# Patient Record
Sex: FEMALE | Race: WHITE | NOT HISPANIC OR LATINO | Employment: UNEMPLOYED | URBAN - METROPOLITAN AREA
[De-identification: names, ages, dates, MRNs, and addresses within clinical notes are randomized per-mention and may not be internally consistent; named-entity substitution may affect disease eponyms.]

---
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
## 2024-04-08 ENCOUNTER — TRANSCRIBE ORDERS (OUTPATIENT)
Dept: CARDIOLOGY | Facility: HOSPITAL | Age: 19
End: 2024-04-08
Payer: COMMERCIAL

## 2024-04-08 ENCOUNTER — HOSPITAL ENCOUNTER (OUTPATIENT)
Dept: CARDIOLOGY | Facility: HOSPITAL | Age: 19
Discharge: HOME | End: 2024-04-08
Admitting: STUDENT IN AN ORGANIZED HEALTH CARE EDUCATION/TRAINING PROGRAM
Payer: COMMERCIAL

## 2024-04-08 DIAGNOSIS — R06.02 SHORTNESS OF BREATH: Primary | ICD-10-CM

## 2024-04-08 PROCEDURE — 93005 ELECTROCARDIOGRAM TRACING: CPT

## 2024-04-09 LAB
QT INTERVAL: 356 MS
QTC INTERVAL: 462 MS

## 2024-05-16 ENCOUNTER — OFFICE VISIT (OUTPATIENT)
Dept: CARDIOLOGY | Facility: CLINIC | Age: 19
End: 2024-05-16
Payer: COMMERCIAL

## 2024-05-16 VITALS
WEIGHT: 107 LBS | BODY MASS INDEX: 18.96 KG/M2 | DIASTOLIC BLOOD PRESSURE: 72 MMHG | HEART RATE: 83 BPM | HEIGHT: 63 IN | SYSTOLIC BLOOD PRESSURE: 110 MMHG

## 2024-05-16 DIAGNOSIS — R94.31 NONSPECIFIC ABNORMAL ELECTROCARDIOGRAM (ECG): ICD-10-CM

## 2024-05-16 DIAGNOSIS — R06.02 SHORTNESS OF BREATH: Primary | ICD-10-CM

## 2024-05-16 DIAGNOSIS — G90.A POTS (POSTURAL ORTHOSTATIC TACHYCARDIA SYNDROME): ICD-10-CM

## 2024-05-16 DIAGNOSIS — R42 DIZZINESS: ICD-10-CM

## 2024-05-16 PROCEDURE — 93000 ELECTROCARDIOGRAM COMPLETE: CPT | Performed by: INTERNAL MEDICINE

## 2024-05-16 PROCEDURE — 99204 OFFICE O/P NEW MOD 45 MIN: CPT | Performed by: INTERNAL MEDICINE

## 2024-05-16 NOTE — PROGRESS NOTES
Date of Office Visit: 2024  Encounter Provider: Kindra Blackburn MD  Place of Service: [facility] CARDIOLOGY  Patient Name: Dayana Sosa  :2005      Patient ID:  Dayana Sosa is a 19 y.o. female is here for dyspnea and dizziness.           History of Present Illness    She has history of asthma, depression anxiety, dysmenorrhea, acne, ADHD.  She is here for short windedness, dizziness and abnormal ECG.    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  Her grandparents have had heart disease, strokes, hypertension and diabetes.  She lives with her grandparents, her dad and 3 cats    She had a normal TSH and free T4 done 2023.  She saw her PCP for dizziness and was told she had a vasovagal reaction.    She has had short windedness with dizziness, syncope and near syncope since early high school.  The syncope has actually gotten better and she has had no recent syncope but when she is head upright, she will be in sweating, she has nausea, she feels her heart pounding, her vision changes in the periphery and then she feels like she is going to pass out.  She now notices this when she is trying to do any physical activity, she is very short winded and feels her heart racing, she gets severely weak and fatigued and has to lie down.    She does not exercise regularly.  She has never been diagnosed with a heart rhythm disturbance.  She is on spironolactone for acne.  It really does help her.    History reviewed. No pertinent past medical history.      History reviewed. No pertinent surgical history.    Current Outpatient Medications on File Prior to Visit   Medication Sig Dispense Refill    albuterol sulfate  (90 Base) MCG/ACT inhaler Inhale 2-4 puffs Every 4 (Four) to 6 (Six) Hours As Needed. 6.7 g 1    desogestrel-ethinyl estradiol (Isibloom) 0.15-30 MG-MCG per  tablet Take 1 tablet by mouth Daily. 28 tablet 6    FLUoxetine (PROzac) 40 MG capsule Take 1 capsule by mouth Daily. 30 capsule 0    hydrocortisone 2.5 % cream Apply 1 application  topically to the appropriate area as directed Daily As Needed. Do not use more than 15 days 30 g 2    lisdexamfetamine (Vyvanse) 40 MG capsule Take 1 capsule by mouth Daily. 30 capsule 0    spironolactone (ALDACTONE) 100 MG tablet Take 1 tablet by mouth Daily. 30 tablet 7    [DISCONTINUED] atomoxetine (STRATTERA) 80 MG capsule Take 1 capsule by mouth Daily after completing 1 week of 40 mg atomoxetine (Patient not taking: Reported on 5/16/2024) 7 capsule 0    [DISCONTINUED] cephalexin (KEFLEX) 500 MG capsule Take 1 capsule by mouth twice daily (Patient not taking: Reported on 5/16/2024) 14 capsule 0    [DISCONTINUED] dexmethylphenidate XR (Focalin XR) 10 MG 24 hr capsule Take 1 capsule by mouth Daily (Patient not taking: Reported on 5/16/2024) 30 capsule 0    [DISCONTINUED] dexmethylphenidate XR (Focalin XR) 20 MG 24 hr capsule Take 1 capsule by mouth Daily (Patient not taking: Reported on 5/16/2024) 30 capsule 0    [DISCONTINUED] dexmethylphenidate XR (Focalin XR) 30 MG 24 hr capsule Take 1 capsule by mouth Daily. (Patient not taking: Reported on 5/16/2024) 30 capsule 0    [DISCONTINUED] FLUoxetine (PROzac) 10 MG capsule Take 1 capsule by mouth Daily with 20 mg (Patient not taking: Reported on 5/16/2024) 30 capsule 0    [DISCONTINUED] FLUoxetine (PROzac) 10 MG capsule Take 1 capsule by mouth Daily for 5 days to finish med taper, take 3-5 day med break before starting new medication (Patient not taking: Reported on 5/16/2024) 5 capsule 0    [DISCONTINUED] FLUoxetine (PROzac) 20 MG capsule Take 1 capsule by mouth Daily with 10mg for total dose of 30mg. 30 capsule 0    [DISCONTINUED] FLUoxetine (PROzac) 20 MG capsule Take one capsule by mouth daily 30 capsule 2    [DISCONTINUED] FLUoxetine (PROzac) 40 MG capsule Take 1 capsule by mouth daily  (Patient not taking: Reported on 5/16/2024) 90 capsule 0    [DISCONTINUED] hydrOXYzine pamoate (VISTARIL) 25 MG capsule Take 1 capsule by mouth At Night As Needed. (Patient not taking: Reported on 5/16/2024) 30 capsule 6    [DISCONTINUED] lisdexamfetamine (Vyvanse) 20 MG capsule Take 1 capsule by mouth Daily (Patient not taking: Reported on 5/16/2024) 30 capsule 0    [DISCONTINUED] lisdexamfetamine (Vyvanse) 30 MG capsule Take 1 capsule by mouth Daily. (Patient not taking: Reported on 5/16/2024) 30 capsule 0    [DISCONTINUED] minocycline (MINOCIN,DYNACIN) 100 MG capsule Take 1 capsule by mouth Daily with water (Patient not taking: Reported on 5/16/2024) 30 capsule 3    [DISCONTINUED] minocycline (MINOCIN,DYNACIN) 100 MG capsule take 1 capsule by mouth daily with water (Patient not taking: Reported on 5/16/2024) 30 capsule 0    [DISCONTINUED] ofloxacin (OCUFLOX) 0.3 % ophthalmic solution Apply 2 drops to eye(s) as directed by provider 2 (Two) Times a Day. (Patient not taking: Reported on 5/16/2024) 10 mL 0    [DISCONTINUED] spironolactone (ALDACTONE) 100 MG tablet Take 1 tablet by mouth Daily. 30 tablet 3    [DISCONTINUED] spironolactone (ALDACTONE) 100 MG tablet take 1 tablet by mouth daily (Patient not taking: Reported on 5/16/2024) 30 tablet 6    [DISCONTINUED] spironolactone (ALDACTONE) 50 MG tablet Take 1 tablet by mouth every night at bedtime. (Patient not taking: Reported on 5/16/2024) 30 tablet 3    [DISCONTINUED] spironolactone (ALDACTONE) 50 MG tablet Take 2 tablets by mouth Every Night. (Patient not taking: Reported on 5/16/2024) 60 tablet 2     No current facility-administered medications on file prior to visit.       Social History     Socioeconomic History    Marital status: Single    Number of children: 0   Tobacco Use    Smoking status: Never     Passive exposure: Never    Smokeless tobacco: Never    Tobacco comments:     Caffeine: 1 tea per day   Vaping Use    Vaping status: Never Used   Substance  "and Sexual Activity    Alcohol use: Not Currently    Drug use: Not Currently    Sexual activity: Defer             Procedures    ECG 12 Lead    Date/Time: 5/16/2024 9:15 AM  Performed by: Kindra Blackburn MD    Authorized by: Kindra Blackburn MD  Comparison: compared with previous ECG   Similar to previous ECG  Rhythm: sinus rhythm  QRS axis: right    Clinical impression: non-specific ECG              Objective:      Vitals:    05/16/24 0853   BP: 110/72   Pulse: 83   Weight: 48.5 kg (107 lb)   Height: 160 cm (63\")     Body mass index is 18.95 kg/m².    Vitals reviewed.   Constitutional:       General: Not in acute distress.     Appearance: Not diaphoretic.   Neck:      Vascular: No carotid bruit or JVD.   Pulmonary:      Effort: Pulmonary effort is normal.      Breath sounds: Normal breath sounds.   Cardiovascular:      Normal rate. Regular rhythm.      Murmurs: There is no murmur.      No gallop.  No rub.   Pulses:     Intact distal pulses.      Carotid: 2+ bilaterally.     Radial: 2+ bilaterally.     Dorsalis pedis: 2+ bilaterally.     Posterior tibial: 2+ bilaterally.  Edema:     Peripheral edema absent.   Neurological:      Cranial Nerves: No cranial nerve deficit.         Lab Review:       Assessment:      Diagnosis Plan   1. Shortness of breath  Holter Monitor - 72 Hour Up To 15 Days    Adult Transthoracic Echo Complete W/ Cont if Necessary Per Protocol      2. Dizziness  Holter Monitor - 72 Hour Up To 15 Days    Adult Transthoracic Echo Complete W/ Cont if Necessary Per Protocol      3. Nonspecific abnormal electrocardiogram (ECG)  Holter Monitor - 72 Hour Up To 15 Days    Adult Transthoracic Echo Complete W/ Cont if Necessary Per Protocol        Short windedness with activity associated with heart racing, severe fatigue and weakness.  I think she has deconditioning at this point but has signs and symptoms consistent with postural orthostatic tachycardia syndrome.  I provided the [facility] article about " POTS as well as a lifestyle instructional sheet for treatment of POTS.  I also advised her to increase her protein consumption in her diet as well as salt water.  I will get an echocardiogram for slightly abnormal ECG as well as set the monitor to make sure she does not have SVT.  Acne, spironolactone has really helped with this and she does not really want to stop it.  ADHD, on Vyvanse     Plan:       No medication changes at this time.  Orthostatics today show blood pressure 110/70 there is 74 after lying flat for 5 minutes, blood pressure 100/70 with heart rate 82 after standing for 1 minute-she has black spots in her eyes, blood pressure 110/76 with a heart rate of 90 after standing for 3 minutes.    She is weakly positive for POTS.  Would have her institute lifestyle measures as noted above.  See nurse practitioner in 4 weeks and see me in 8 to 9 weeks.

## 2024-06-04 ENCOUNTER — OFFICE VISIT (OUTPATIENT)
Dept: OBSTETRICS AND GYNECOLOGY | Facility: CLINIC | Age: 19
End: 2024-06-04
Payer: COMMERCIAL

## 2024-06-04 VITALS
HEIGHT: 63 IN | DIASTOLIC BLOOD PRESSURE: 72 MMHG | WEIGHT: 102 LBS | BODY MASS INDEX: 18.07 KG/M2 | SYSTOLIC BLOOD PRESSURE: 104 MMHG

## 2024-06-04 DIAGNOSIS — N89.8 VAGINAL IRRITATION: Primary | ICD-10-CM

## 2024-06-04 RX ORDER — FLUCONAZOLE 150 MG/1
150 TABLET ORAL
Qty: 2 TABLET | Refills: 0 | Status: SHIPPED | OUTPATIENT
Start: 2024-06-04

## 2024-06-04 NOTE — PROGRESS NOTES
"GYN EXAM    Chief Complaint   Patient presents with    ngyn     Pt here today for vaginal irritation using feminine pads and is dry and cracked        SUBJECTIVE     Dayana is a 19 y.o. No obstetric history on file. who presents with complaints of itching and cracking of vulvar skin for the past month. She reports that this started with her last menses with pad use. She tried aquaphor and it soothed her skin temporarily. She also reports getting partial relief using monistat treatment.     Discharge: white discharge  Itching: endorses  Vaginal odor: denies  Pelvic pain: denies  Dysuria: denies  Urine odor: denies  New sexual partners: denies  Change in soaps or detergents: denies  Douching: denies    History reviewed. No pertinent past medical history.     History reviewed. No pertinent surgical history.     Review of Systems   Genitourinary:  Negative for decreased urine volume, difficulty urinating, dyspareunia, dysuria, flank pain, frequency, genital sores, hematuria, urgency, vaginal bleeding, vaginal discharge and vaginal pain.   All other systems reviewed and are negative.      OBJECTIVE     Vitals:    06/04/24 1359   BP: 104/72   Weight: 46.3 kg (102 lb)   Height: 160 cm (62.99\")        Physical Exam  Constitutional:       General: She is awake.      Appearance: Normal appearance. She is well-developed and well-groomed.   Genitourinary:      Right Labia: rash.      Left Labia: rash.           HENT:      Head: Normocephalic and atraumatic.   Pulmonary:      Effort: Pulmonary effort is normal.   Musculoskeletal:      Cervical back: Normal range of motion.   Neurological:      General: No focal deficit present.      Mental Status: She is alert and oriented to person, place, and time.   Skin:     General: Skin is warm and dry.   Psychiatric:         Mood and Affect: Mood normal.         Behavior: Behavior normal. Behavior is cooperative.   Vitals reviewed.         ASSESSMENT/PLAN    Diagnoses and all orders for " this visit:    1. Vaginal irritation (Primary)  -     NuSwab VG+ - Swab, Vagina  -     fluconazole (DIFLUCAN) 150 MG tablet; Take 1 tablet by mouth Every 3 (Three) Days.  Dispense: 2 tablet; Refill: 0    STD screen today - NuSwab.  Vaginal cultures obtained.   Encouraged condoms with intercourse, cotton only underwear, mild or no soaps, avoidance of douching or pierre steaming.   Encouraged adding probiotic daily or vaginal boric acid suppositories.     Return if symptoms worsen or fail to improve.    I spent 15 minutes caring for Dayana on this date of service. This time includes time spent by me in the following activities: preparing for the visit, reviewing tests, obtaining and/or reviewing a separately obtained history, performing a medically appropriate examination and/or evaluation, counseling and educating the patient/family/caregiver, ordering medications, tests, or procedures, referring and communicating with other health care professionals, documenting information in the medical record, independently interpreting results and communicating that information with the patient/family/caregiver, and care coordination    Shanda Garcia CNM  6/4/2024  15:11 EDT

## 2024-06-05 LAB
A VAGINAE DNA VAG QL NAA+PROBE: NORMAL SCORE
BVAB2 DNA VAG QL NAA+PROBE: NORMAL SCORE
C ALBICANS DNA VAG QL NAA+PROBE: NEGATIVE
C GLABRATA DNA VAG QL NAA+PROBE: NEGATIVE
C TRACH DNA VAG QL NAA+PROBE: NEGATIVE
MEGA1 DNA VAG QL NAA+PROBE: NORMAL SCORE
N GONORRHOEA DNA VAG QL NAA+PROBE: NEGATIVE
T VAGINALIS DNA VAG QL NAA+PROBE: NEGATIVE

## 2024-06-13 ENCOUNTER — HOSPITAL ENCOUNTER (OUTPATIENT)
Dept: CARDIOLOGY | Facility: HOSPITAL | Age: 19
Discharge: HOME | End: 2024-06-13
Admitting: INTERNAL MEDICINE
Payer: COMMERCIAL

## 2024-06-13 ENCOUNTER — PATIENT MESSAGE (OUTPATIENT)
Dept: OBSTETRICS AND GYNECOLOGY | Facility: CLINIC | Age: 19
End: 2024-06-13
Payer: COMMERCIAL

## 2024-06-13 ENCOUNTER — PATIENT ROUNDING (BHMG ONLY) (OUTPATIENT)
Dept: OBSTETRICS AND GYNECOLOGY | Facility: CLINIC | Age: 19
End: 2024-06-13
Payer: COMMERCIAL

## 2024-06-13 VITALS
WEIGHT: 102 LBS | DIASTOLIC BLOOD PRESSURE: 78 MMHG | SYSTOLIC BLOOD PRESSURE: 110 MMHG | HEIGHT: 62 IN | BODY MASS INDEX: 18.77 KG/M2 | HEART RATE: 96 BPM

## 2024-06-13 DIAGNOSIS — R42 DIZZINESS: ICD-10-CM

## 2024-06-13 DIAGNOSIS — R06.02 SHORTNESS OF BREATH: ICD-10-CM

## 2024-06-13 DIAGNOSIS — R94.31 NONSPECIFIC ABNORMAL ELECTROCARDIOGRAM (ECG): ICD-10-CM

## 2024-06-13 LAB
AORTIC ARCH: 2.1 CM
AORTIC DIMENSIONLESS INDEX: 0.8 (DI)
BH CV ECHO MEAS - ACS: 1.71 CM
BH CV ECHO MEAS - AO MAX PG: 5.3 MMHG
BH CV ECHO MEAS - AO MEAN PG: 3.1 MMHG
BH CV ECHO MEAS - AO ROOT DIAM: 2.46 CM
BH CV ECHO MEAS - AO V2 MAX: 115.6 CM/SEC
BH CV ECHO MEAS - AO V2 VTI: 21.7 CM
BH CV ECHO MEAS - AVA(I,D): 1.82 CM2
BH CV ECHO MEAS - EDV(CUBED): 64.3 ML
BH CV ECHO MEAS - EDV(MOD-SP2): 62 ML
BH CV ECHO MEAS - EDV(MOD-SP4): 65 ML
BH CV ECHO MEAS - EF(MOD-BP): 62 %
BH CV ECHO MEAS - EF(MOD-SP2): 61.3 %
BH CV ECHO MEAS - EF(MOD-SP4): 61.5 %
BH CV ECHO MEAS - ESV(CUBED): 20 ML
BH CV ECHO MEAS - ESV(MOD-SP2): 24 ML
BH CV ECHO MEAS - ESV(MOD-SP4): 25 ML
BH CV ECHO MEAS - FS: 32.2 %
BH CV ECHO MEAS - IVS/LVPW: 0.96 CM
BH CV ECHO MEAS - IVSD: 0.65 CM
BH CV ECHO MEAS - LV DIASTOLIC VOL/BSA (35-75): 45.3 CM2
BH CV ECHO MEAS - LV MASS(C)D: 73.4 GRAMS
BH CV ECHO MEAS - LV MAX PG: 3.4 MMHG
BH CV ECHO MEAS - LV MEAN PG: 1.98 MMHG
BH CV ECHO MEAS - LV SYSTOLIC VOL/BSA (12-30): 17.4 CM2
BH CV ECHO MEAS - LV V1 MAX: 91.7 CM/SEC
BH CV ECHO MEAS - LV V1 VTI: 16.4 CM
BH CV ECHO MEAS - LVIDD: 4 CM
BH CV ECHO MEAS - LVIDS: 2.7 CM
BH CV ECHO MEAS - LVOT AREA: 2.41 CM2
BH CV ECHO MEAS - LVOT DIAM: 1.75 CM
BH CV ECHO MEAS - LVPWD: 0.68 CM
BH CV ECHO MEAS - MV A DUR: 0.08 SEC
BH CV ECHO MEAS - MV A MAX VEL: 75.6 CM/SEC
BH CV ECHO MEAS - MV DEC SLOPE: 556.5 CM/SEC2
BH CV ECHO MEAS - MV DEC TIME: 0.17 SEC
BH CV ECHO MEAS - MV E MAX VEL: 80.1 CM/SEC
BH CV ECHO MEAS - MV E/A: 1.06
BH CV ECHO MEAS - MV MAX PG: 3.8 MMHG
BH CV ECHO MEAS - MV MEAN PG: 1.96 MMHG
BH CV ECHO MEAS - MV P1/2T: 51 MSEC
BH CV ECHO MEAS - MV V2 VTI: 20.2 CM
BH CV ECHO MEAS - MVA(P1/2T): 4.3 CM2
BH CV ECHO MEAS - MVA(VTI): 1.97 CM2
BH CV ECHO MEAS - PA ACC TIME: 0.12 SEC
BH CV ECHO MEAS - PA V2 MAX: 112.9 CM/SEC
BH CV ECHO MEAS - PULM A REVS DUR: 0.08 SEC
BH CV ECHO MEAS - PULM A REVS VEL: 42.8 CM/SEC
BH CV ECHO MEAS - PULM DIAS VEL: 57.3 CM/SEC
BH CV ECHO MEAS - PULM S/D: 0.69
BH CV ECHO MEAS - PULM SYS VEL: 39.6 CM/SEC
BH CV ECHO MEAS - RAP SYSTOLE: 3 MMHG
BH CV ECHO MEAS - RV MAX PG: 3.2 MMHG
BH CV ECHO MEAS - RV V1 MAX: 89.2 CM/SEC
BH CV ECHO MEAS - RV V1 VTI: 16.8 CM
BH CV ECHO MEAS - RVSP: 20 MMHG
BH CV ECHO MEAS - SUP REN AO DIAM: 1.8 CM
BH CV ECHO MEAS - SV(LVOT): 39.6 ML
BH CV ECHO MEAS - SV(MOD-SP2): 38 ML
BH CV ECHO MEAS - SV(MOD-SP4): 40 ML
BH CV ECHO MEAS - SVI(LVOT): 27.6 ML/M2
BH CV ECHO MEAS - SVI(MOD-SP2): 26.5 ML/M2
BH CV ECHO MEAS - SVI(MOD-SP4): 27.9 ML/M2
BH CV ECHO MEAS - TAPSE (>1.6): 1.6 CM
BH CV ECHO MEAS - TR MAX PG: 17 MMHG
BH CV ECHO MEAS - TR MAX VEL: 206.3 CM/SEC
LEFT ATRIUM VOLUME INDEX: 13.8 ML/M2
SINUS: 2.14 CM
STJ: 1.84 CM

## 2024-06-13 PROCEDURE — 93306 TTE W/DOPPLER COMPLETE: CPT

## 2024-06-13 NOTE — PROGRESS NOTES
My chart message has been sent to the patient for PATIENT ROUNDING with [facility] [place].

## 2024-06-14 ENCOUNTER — TELEPHONE (OUTPATIENT)
Dept: CARDIOLOGY | Facility: CLINIC | Age: 19
End: 2024-06-14
Payer: COMMERCIAL

## 2024-06-14 NOTE — TELEPHONE ENCOUNTER
Please let her know that echo looks good.  Her heart is strong and functioning well.  Would continue current medications and keep currently scheduled follow-up appointment with Dr. Blackburn in July.

## 2024-06-14 NOTE — TELEPHONE ENCOUNTER
Left voicemail for Dayana Lisa Manisha Sosa requesting callback.    HUB: please transfer to Triage if patient returns call    Thank you,  Gladis BARRIENTOS RN  Triage Nurse [facility]   12:19 EDT

## 2024-06-17 NOTE — TELEPHONE ENCOUNTER
Left voicemail for Dayanayakelin Sosa requesting callback.    HUB: please transfer to Triage if patient returns call    Thank you,  Gladis BARRIENTOS RN  Triage Nurse [facility] [place]   14:30 EDT

## 2024-07-10 ENCOUNTER — TELEPHONE (OUTPATIENT)
Dept: CARDIOLOGY | Facility: CLINIC | Age: 19
End: 2024-07-10
Payer: COMMERCIAL

## 2024-07-10 NOTE — TELEPHONE ENCOUNTER
Please call patient, monitor shows sinus tachycardia when she has dizziness or short windedness.  Overall benign monitor.  Monitor seems also to correlate well with POTS.

## 2024-07-10 NOTE — TELEPHONE ENCOUNTER
Called and left VM, will continue to try to reach pt.    HUB- please put patient straight through to triage    Deepali Rivera, RN  Triage RN  07/10/24 08:38 EDT

## 2024-07-11 NOTE — TELEPHONE ENCOUNTER
Called and left VM, will continue to try to reach pt.    HUB- please put patient straight through to triage    Deepali Rivera RN  Triage RN  07/11/24 09:11 EDT

## 2024-07-12 NOTE — TELEPHONE ENCOUNTER
Called and left VM, will continue to try to reach pt.    HUB- please put patient straight through to triage    Deepali Rivera RN  Triage RN  07/12/24 09:19 EDT

## 2024-07-18 ENCOUNTER — OFFICE VISIT (OUTPATIENT)
Dept: CARDIOLOGY | Facility: CLINIC | Age: 19
End: 2024-07-18
Payer: COMMERCIAL

## 2024-07-18 VITALS
DIASTOLIC BLOOD PRESSURE: 70 MMHG | WEIGHT: 106 LBS | BODY MASS INDEX: 19.51 KG/M2 | SYSTOLIC BLOOD PRESSURE: 110 MMHG | OXYGEN SATURATION: 99 % | HEIGHT: 62 IN | HEART RATE: 87 BPM

## 2024-07-18 DIAGNOSIS — G90.A POTS (POSTURAL ORTHOSTATIC TACHYCARDIA SYNDROME): Primary | ICD-10-CM

## 2024-07-18 PROCEDURE — 93000 ELECTROCARDIOGRAM COMPLETE: CPT | Performed by: INTERNAL MEDICINE

## 2024-07-18 PROCEDURE — 99214 OFFICE O/P EST MOD 30 MIN: CPT | Performed by: INTERNAL MEDICINE

## 2024-07-18 RX ORDER — MIDODRINE HYDROCHLORIDE 2.5 MG/1
2.5 TABLET ORAL 2 TIMES DAILY
Qty: 60 TABLET | Refills: 11 | Status: SHIPPED | OUTPATIENT
Start: 2024-07-18

## 2024-07-18 NOTE — PROGRESS NOTES
Date of Office Visit: 2024  Encounter Provider: Kindra Blackburn MD  Place of Service: [facility] CARDIOLOGY  Patient Name: Dayana Sosa  :2005      Patient ID:  Dayana Sosa is a 19 y.o. female is here for dyspnea and dizziness.           History of Present Illness    She has history of asthma, depression anxiety, dysmenorrhea, acne, ADHD.  She is here for short windedness, dizziness and abnormal ECG.    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  Her grandparents have had heart disease, strokes, hypertension and diabetes.  She lives with her grandparents, her dad and 3 cats    She had a normal TSH and free T4 done 2023.  She saw her PCP for dizziness and was told she had a vasovagal reaction.    She has had short windedness with dizziness, syncope and near syncope since early high school.  The syncope has actually gotten better and she has had no recent syncope but when she is head upright, she will be in sweating, she has nausea, she feels her heart pounding, her vision changes in the periphery and then she feels like she is going to pass out.  She now notices this when she is trying to do any physical activity, she is very short winded and feels her heart racing, she gets severely weak and fatigued and has to lie down.    She is on spironolactone for acne.  It really does help her.    Echocardiogram done 2024 was normal.  2 weeks Zio patch monitor done 2024 showed average heart rate of 88 bpm, dizziness, short windedness and heart racing all correlated with sinus tachycardia.    She still notices fogginess and dizziness especially when is hot out.  She is trying to exercise by swimming, and doing leg exercises and walking.  She does notice that she is short winded and worn out if she has to walk in the heat or walk upstairs.  She has had no syncope  or falls.  She overall feels better because she knows what she is managing but she does not feel great.    History reviewed. No pertinent past medical history.      History reviewed. No pertinent surgical history.    Current Outpatient Medications on File Prior to Visit   Medication Sig Dispense Refill    albuterol sulfate  (90 Base) MCG/ACT inhaler Inhale 2-4 puffs Every 4 (Four) to 6 (Six) Hours As Needed. 6.7 g 1    desogestrel-ethinyl estradiol (Isibloom) 0.15-30 MG-MCG per tablet Take 1 tablet by mouth Daily. 28 tablet 6    fluconazole (DIFLUCAN) 150 MG tablet Take 1 tablet by mouth Every 3 (Three) Days. 2 tablet 0    FLUoxetine (PROzac) 40 MG capsule Take 1 capsule by mouth Daily. 90 capsule 0    hydrocortisone 2.5 % cream Apply 1 application  topically to the appropriate area as directed Daily As Needed. Do not use more than 15 days 30 g 2    lisdexamfetamine (Vyvanse) 40 MG capsule Take 1 capsule by mouth Daily. Will need appointment for further refills. 30 capsule 0    spironolactone (ALDACTONE) 100 MG tablet Take 1 tablet by mouth Daily. 30 tablet 7     No current facility-administered medications on file prior to visit.       Social History     Socioeconomic History    Marital status: Single    Number of children: 0   Tobacco Use    Smoking status: Never     Passive exposure: Never    Smokeless tobacco: Never    Tobacco comments:     Caffeine: 1 tea per day   Vaping Use    Vaping status: Never Used   Substance and Sexual Activity    Alcohol use: Not Currently    Drug use: Not Currently    Sexual activity: Yes             Procedures    ECG 12 Lead    Date/Time: 7/18/2024 9:40 AM  Performed by: Kindra Blackburn MD    Authorized by: Kindra Blackburn MD  Comparison: compared with previous ECG   Similar to previous ECG  Rhythm: sinus rhythm    Clinical impression: normal ECG            Objective:      Vitals:    07/18/24 0927 07/18/24 0930   BP: 109/70 110/70   BP Location: Right arm Left arm  "  Patient Position: Sitting Sitting   Pulse:  87   SpO2: 99%    Weight: 48.1 kg (106 lb)    Height: 157.5 cm (62\")      Body mass index is 19.39 kg/m².    Vitals reviewed.   Constitutional:       General: Not in acute distress.     Appearance: Not diaphoretic.   Neck:      Vascular: No carotid bruit or JVD.   Pulmonary:      Effort: Pulmonary effort is normal.      Breath sounds: Normal breath sounds.   Cardiovascular:      Normal rate. Regular rhythm.      Murmurs: There is no murmur.      No gallop.  No rub.   Pulses:     Intact distal pulses.      Carotid: 2+ bilaterally.     Radial: 2+ bilaterally.     Dorsalis pedis: 2+ bilaterally.     Posterior tibial: 2+ bilaterally.  Edema:     Peripheral edema absent.   Neurological:      Cranial Nerves: No cranial nerve deficit.       Lab Review:       Assessment:      Diagnosis Plan   1. POTS (postural orthostatic tachycardia syndrome)            POTS, with short windedness with activity associated with heart racing, severe fatigue and weakness.  Try low-dose midodrine 2.5 mg in the morning and 2.5 mg at 12 to 1 PM.  Acne, spironolactone has really helped with this and she does not really want to stop it.  ADHD, on Vyvanse     Plan:       Continue lifestyle measures, start midodrine, see APRN in 6 weeks.  Advised continued exercise to improve her deconditioning.  "

## 2024-11-11 ENCOUNTER — TELEPHONE (OUTPATIENT)
Dept: OBSTETRICS AND GYNECOLOGY | Facility: CLINIC | Age: 19
End: 2024-11-11
Payer: COMMERCIAL

## 2024-11-11 NOTE — TELEPHONE ENCOUNTER
Patient had pap 6/6/2024. At that time she was getting birth control through her PCP. Requesting if possible to get birth control through you. She takes generic Isibloom. She uses [facility].Pt Ph [phone]

## 2024-11-14 ENCOUNTER — OFFICE VISIT (OUTPATIENT)
Dept: OBSTETRICS AND GYNECOLOGY | Facility: CLINIC | Age: 19
End: 2024-11-14
Payer: COMMERCIAL

## 2024-11-14 VITALS
BODY MASS INDEX: 20.8 KG/M2 | DIASTOLIC BLOOD PRESSURE: 66 MMHG | SYSTOLIC BLOOD PRESSURE: 119 MMHG | WEIGHT: 113 LBS | HEIGHT: 62 IN

## 2024-11-14 DIAGNOSIS — Z30.011 ENCOUNTER FOR INITIAL PRESCRIPTION OF CONTRACEPTIVE PILLS: Primary | ICD-10-CM

## 2024-11-14 LAB
B-HCG UR QL: NEGATIVE
EXPIRATION DATE: NORMAL
INTERNAL NEGATIVE CONTROL: NEGATIVE
INTERNAL POSITIVE CONTROL: POSITIVE
Lab: NORMAL

## 2024-11-14 RX ORDER — NORETHINDRONE ACETATE AND ETHINYL ESTRADIOL, ETHINYL ESTRADIOL AND FERROUS FUMARATE 1MG-10(24)
1 KIT ORAL DAILY
Qty: 84 TABLET | Refills: 3 | Status: CANCELLED | OUTPATIENT
Start: 2024-11-14

## 2024-11-14 RX ORDER — DESOGESTREL AND ETHINYL ESTRADIOL 0.15-0.03
1 KIT ORAL DAILY
Qty: 84 TABLET | Refills: 3 | Status: SHIPPED | OUTPATIENT
Start: 2024-11-14 | End: 2025-11-14

## 2024-11-14 NOTE — PATIENT INSTRUCTIONS
SOME SIDE EFFECTS OF BIRTH CONTROL CAN BE SERIOUS. THE FOLLOWING SYMPTOMS ARE UNCOMMON, BUT IF YOU EXPERIENCE ANY OF THEM CALL YOUR DOCTOR IMMEDIATELY:    pain in the back of the lower leg  sharp, sudden, or crushing chest pain  heaviness in chest  sudden shortness of breath  sudden severe headache, vomiting, dizziness, or fainting  sudden problems with speech  weakness or numbness of an arm or leg  double vision, blurred vision, or other changes in vision  dark patches of skin on forehead, cheeks, upper lip, and/or chin  yellowing of skin or eyes; loss of appetite; dark urine; extreme tiredness; weakness; or light-colored bowel movements  sudden high fever, vomiting, diarrhea, fainting or feeling faint when standing up, rash, muscle aches, or dizziness  depression; difficulty sleeping or staying asleep; loss of energy; or other mood changes  rash; swelling of the hands, feet, ankles, or lower legs; hives; or itching

## 2024-11-14 NOTE — PROGRESS NOTES
"CONTRACEPTION MANAGEMENT VISIT    Chief Complaint   Patient presents with    Follow-up     Here today for contraception management        SUBJECTIVE     Dayana is a 19 y.o. No obstetric history on file. who presents today to discuss contraception management. LMP: Patient's last menstrual period was 11/05/2024.. Denies history of migraine with aura, denies history of DVT, there is no family history of DVT. She is a nonsmoker.      History reviewed. No pertinent past medical history.     History reviewed. No pertinent surgical history.     Review of Systems   Constitutional:  Negative for chills, diaphoresis, fatigue and fever.   Genitourinary:  Negative for decreased urine volume, difficulty urinating, dyspareunia, dysuria, flank pain, frequency, genital sores, hematuria, pelvic pain, urgency, vaginal discharge and vaginal pain.   Hematological:  Negative for adenopathy.   All other systems reviewed and are negative.      OBJECTIVE    Vitals:    11/14/24 1546   BP: 119/66   Weight: 51.3 kg (113 lb)   Height: 157.5 cm (62.01\")        Physical Exam  Constitutional:       General: She is awake.      Appearance: Normal appearance. She is well-developed and well-groomed.   HENT:      Head: Normocephalic and atraumatic.   Pulmonary:      Effort: Pulmonary effort is normal.   Musculoskeletal:      Cervical back: Normal range of motion.   Neurological:      General: No focal deficit present.      Mental Status: She is alert and oriented to person, place, and time.   Skin:     General: Skin is warm and dry.   Psychiatric:         Mood and Affect: Mood normal.         Behavior: Behavior normal. Behavior is cooperative.   Vitals reviewed.         ASSESSMENT/PLAN    Diagnoses and all orders for this visit:    1. Encounter for initial prescription of contraceptive pills (Primary)  -     POC Pregnancy, Urine  -     desogestrel-ethinyl estradiol (APRI) 0.15-30 MG-MCG per tablet; Take 1 tablet by mouth Daily.  Dispense: 84 tablet; " Refill: 3    Education given regarding options for contraception, including oral contraceptives.  Discussed use of [facility] for her convenience of getting her OCP via mail.   She would like to continue on her Isibloom.   ACHES discussed.    Return in about 3 months (around 2/14/2025) for next visit to discuss OCP.    I spent 30 minutes caring for Dayana on this date of service. This time includes time spent by me in the following activities: preparing for the visit, reviewing tests, performing a medically appropriate examination and/or evaluation, counseling and educating the patient/family/caregiver, referring and communicating with other health care professionals, documenting information in the medical record, independently interpreting results and communicating that information with the patient/family/caregiver, care coordination, ordering medications, ordering test(s), ordering procedure(s), obtaining a separately obtained history, and reviewing a separately obtained history    Shanda Garcia CNM  11/17/2024  15:05 EST

## 2025-07-02 ENCOUNTER — OFFICE VISIT (OUTPATIENT)
Dept: FAMILY MEDICINE CLINIC | Facility: CLINIC | Age: 20
End: 2025-07-02
Payer: COMMERCIAL

## 2025-07-02 VITALS
OXYGEN SATURATION: 99 % | SYSTOLIC BLOOD PRESSURE: 108 MMHG | HEART RATE: 82 BPM | DIASTOLIC BLOOD PRESSURE: 84 MMHG | BODY MASS INDEX: 19.62 KG/M2 | WEIGHT: 106.6 LBS | HEIGHT: 62 IN

## 2025-07-02 DIAGNOSIS — Z13.220 LIPID SCREENING: ICD-10-CM

## 2025-07-02 DIAGNOSIS — E11.65 TYPE 2 DIABETES MELLITUS WITH HYPERGLYCEMIA, WITHOUT LONG-TERM CURRENT USE OF INSULIN: Primary | ICD-10-CM

## 2025-07-02 RX ORDER — LIDOCAINE AND PRILOCAINE 25; 25 MG/G; MG/G
CREAM TOPICAL ONCE
Qty: 30 G | Refills: 1 | Status: SHIPPED | OUTPATIENT
Start: 2025-07-02 | End: 2025-07-03

## 2025-07-02 NOTE — PROGRESS NOTES
Subjective     Dayana Sosa is a 20 y.o. female. Presents today for No chief complaint on file.      HPI: Brii Sosa is a 20-year-old female presenting today as a new patient here to establish care and receive an annual physical exam    History of Present Illness  The patient presents to establish care.    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 She engages in extensive writing and computer use as part of her studies.    She also reports a persistent bruise that has been present for approximately a month, initially black in color but now fading. She has noticed similar bruises appearing without any known cause.    She is currently on MyChart and requires refills for her medications. She expresses a fear of needles, to the point of feeling nauseous at the thought of them. She has attempted desensitization by viewing images of needles, but this only resulted in emotional distress. Her last blood work was conducted 1 to 2 years ago.    She has a diagnosis of POTS and experiences foot swelling and pain when standing for extended periods.    FAMILY HISTORY  Her granddad has type II diabetes. Her mom has type I diabetes.         There is no problem list on file for this patient.    No past medical history on file.  No past surgical history on file.  Family History   Problem Relation Age of Onset    Hypertension Maternal Grandmother     Stroke Maternal Grandmother     Hypertension Paternal Grandfather     Heart disease Paternal Grandfather     Diabetes Paternal Grandfather      Social History     Socioeconomic History    Marital status: Single    Number of children: 0   Tobacco Use    Smoking status: Never     Passive exposure: Never    Smokeless tobacco: Never    Tobacco comments:     Caffeine: 1 tea per day   Vaping Use    Vaping status: Never Used   Substance and Sexual Activity     Alcohol use: Not Currently    Drug use: Not Currently    Sexual activity: Yes       Allergies   Allergen Reactions    Zofran [Ondansetron] Hives       Current Outpatient Medications on File Prior to Visit   Medication Sig Dispense Refill    albuterol sulfate  (90 Base) MCG/ACT inhaler Inhale 2-4 puffs Every 4 (Four) to 6 (Six) Hours As Needed. 6.7 g 1    buPROPion XL (WELLBUTRIN XL) 150 MG 24 hr tablet Take 1 tablet by mouth Daily. 30 tablet 1    buPROPion XL (Wellbutrin XL) 300 MG 24 hr tablet Take 1 tablet by mouth Daily. 90 tablet 1    clindamycin (CLEOCIN T) 1 % lotion Apply a thin layer topically to the face 2 (Two) Times a Day. 60 mL 3    desogestrel-ethinyl estradiol (APRI) 0.15-30 MG-MCG per tablet Take 1 tablet by mouth Daily. 84 tablet 3    desogestrel-ethinyl estradiol (Isibloom) 0.15-30 MG-MCG per tablet Take 1 tablet by mouth Daily. 28 tablet 4    fluconazole (DIFLUCAN) 150 MG tablet Take 1 tablet by mouth Every 3 (Three) Days. 2 tablet 0    FLUoxetine (PROzac) 40 MG capsule Take 1 capsule by mouth Daily. 90 capsule 1    FLUoxetine (PROzac) 40 MG capsule Take 1 capsule by mouth Daily. 90 capsule 1    hydrocortisone 2.5 % cream Apply 1 application  topically to the appropriate area as directed Daily As Needed. Do not use more than 15 days 30 g 2    lisdexamfetamine (Vyvanse) 40 MG capsule Take 1 capsule by mouth Daily 30 capsule 0    lisdexamfetamine (VYVANSE) 50 MG capsule Take 1 capsule by mouth daily. 30 capsule 0    midodrine (PROAMATINE) 2.5 MG tablet Take 1 tablet by mouth 2 (Two) Times a Day. Take in AM when waking up and second dose around 12-1 PM 60 tablet 11    minocycline (MINOCIN,DYNACIN) 50 MG capsule Take 1 capsule by mouth Daily. 30 capsule 3    minocycline (MINOCIN,DYNACIN) 50 MG capsule Take 1 capsule by mouth Daily. 30 capsule 3    minocycline (MINOCIN,DYNACIN) 50 MG capsule Take 1 capsule by mouth Daily. 30 capsule 3    spironolactone (ALDACTONE) 100 MG tablet Take 1 tablet by  mouth Daily. 30 tablet 7    spironolactone (ALDACTONE) 100 MG tablet Take 1 tablet by mouth Daily. 30 tablet 3    spironolactone (ALDACTONE) 100 MG tablet Take 1 tablet by mouth every night at bedtime. 30 tablet 3    spironolactone (ALDACTONE) 100 MG tablet Take 1 tablet by mouth Every Night. 30 tablet 3     No current facility-administered medications on file prior to visit.       Objective   There were no vitals filed for this visit.  There is no height or weight on file to calculate BMI.  Physical Exam  Constitutional:       Appearance: She is obese.   HENT:      Head: Normocephalic and atraumatic.      Mouth/Throat:      Mouth: Mucous membranes are moist.   Eyes:      Pupils: Pupils are equal, round, and reactive to light.   Cardiovascular:      Rate and Rhythm: Normal rate and regular rhythm.      Pulses: Normal pulses.      Heart sounds: Normal heart sounds.   Pulmonary:      Effort: Pulmonary effort is normal.      Breath sounds: Normal breath sounds.   Abdominal:      General: Bowel sounds are normal.   Musculoskeletal:         General: Normal range of motion.   Skin:     General: Skin is warm and dry.      Capillary Refill: Capillary refill takes less than 2 seconds.   Neurological:      General: No focal deficit present.      Mental Status: She is alert.      Comments: CN II-XII grossly intact on exam AEB following finger with eyes, puffing cheeks, sticking out tongue, wiggling tongue, raising eyebrows, and shrugging shoulders.   .     Psychiatric:         Mood and Affect: Mood normal.         Physical Exam  Ears were examined. Throat was examined.  Thyroid was examined.  Cranial nerves are all intact.    Results         Procedures     Assessment & Plan   There are no diagnoses linked to this encounter.     Assessment & Plan  1. Tenosynovitis.  She reports pain in her thumbs, which has migrated from her wrists and shoulders. A referral to a hand specialist will be made for further evaluation and potential  lidocaine injections to numb the tendon.    2. Unspecified diagnosis.  She has a persistent bruise that has been present for about a month and is changing colors. She is advised to monitor the bruise for any further changes.    3. Medication management.  She is advised to return for fasting blood work. A prescription for Emla cream will be provided to apply 15 minutes prior to the procedure to help with her fear of needles. She is instructed to drink plenty of water before the blood draw to help with vein visibility.    4. Postural Orthostatic Tachycardia Syndrome (POTS).  She reports swelling in her feet when standing for long periods. No new treatment is initiated at this time.        BMI is within normal parameters. No other follow-up for BMI required.     No follow-ups on file.     Discussed Care Gaps, ordered referrals and encouraged vaccination updates.       - Pt agrees with plan of care and denies further questions/concerns today  - This document is intended for medical expert use only. Persons  reading this document without medical staff guidance may result in misinterpretation and unintended morbidity     Go to the ER for any possible life-threatening symptoms such as chest pain or shortness of air.      Please allow 3-5 business days for recommendations based on new results      I personally spent time with this patient, preparing for the visit, reviewing tests, obtaining and/or reviewing a separately obtained history, performing a medically appropriate examination and/or evaluation, counseling and educating the patient/family/caregiver, ordering medications,  documenting information in the medical record and indepentently interpreting results.       Patient or patient representative verbalized consent for the use of Ambient Listening during the visit with  JILLIAN Snyder for chart documentation. 7/2/2025  16:29 EDT

## 2025-07-09 DIAGNOSIS — M25.542 ARTHRALGIA OF BOTH HANDS: Primary | ICD-10-CM

## 2025-07-09 DIAGNOSIS — M25.541 ARTHRALGIA OF BOTH HANDS: Primary | ICD-10-CM

## 2025-07-24 ENCOUNTER — RESULTS FOLLOW-UP (OUTPATIENT)
Dept: FAMILY MEDICINE CLINIC | Facility: CLINIC | Age: 20
End: 2025-07-24
Payer: COMMERCIAL

## 2025-07-24 LAB
ALBUMIN SERPL-MCNC: 4 G/DL (ref 3.5–5.2)
ALBUMIN/GLOB SERPL: 1.5 G/DL
ALP SERPL-CCNC: 65 U/L (ref 39–117)
ALT SERPL-CCNC: 17 U/L (ref 1–33)
AST SERPL-CCNC: 25 U/L (ref 1–32)
BASOPHILS # BLD AUTO: 0.01 10*3/MM3 (ref 0–0.2)
BASOPHILS NFR BLD AUTO: 0.2 % (ref 0–1.5)
BILIRUB SERPL-MCNC: 0.3 MG/DL (ref 0–1.2)
BUN SERPL-MCNC: 9 MG/DL (ref 6–20)
BUN/CREAT SERPL: 10.2 (ref 7–25)
CALCIUM SERPL-MCNC: 9.9 MG/DL (ref 8.6–10.5)
CHLORIDE SERPL-SCNC: 104 MMOL/L (ref 98–107)
CHOLEST SERPL-MCNC: 223 MG/DL (ref 0–200)
CO2 SERPL-SCNC: 26.5 MMOL/L (ref 22–29)
CREAT SERPL-MCNC: 0.88 MG/DL (ref 0.57–1)
CRP SERPL-MCNC: 0.89 MG/DL (ref 0–0.5)
EGFRCR SERPLBLD CKD-EPI 2021: 96 ML/MIN/1.73
EOSINOPHIL # BLD AUTO: 0.13 10*3/MM3 (ref 0–0.4)
EOSINOPHIL NFR BLD AUTO: 2.8 % (ref 0.3–6.2)
ERYTHROCYTE [DISTWIDTH] IN BLOOD BY AUTOMATED COUNT: 12.2 % (ref 12.3–15.4)
ERYTHROCYTE [SEDIMENTATION RATE] IN BLOOD BY WESTERGREN METHOD: 16 MM/HR (ref 0–20)
GLOBULIN SER CALC-MCNC: 2.7 GM/DL
GLUCOSE SERPL-MCNC: 82 MG/DL (ref 65–99)
HBA1C MFR BLD: 5.1 % (ref 4.8–5.6)
HCT VFR BLD AUTO: 41.7 % (ref 34–46.6)
HDLC SERPL-MCNC: 59 MG/DL (ref 40–60)
HGB BLD-MCNC: 13.7 G/DL (ref 12–15.9)
IMM GRANULOCYTES # BLD AUTO: 0.01 10*3/MM3 (ref 0–0.05)
IMM GRANULOCYTES NFR BLD AUTO: 0.2 % (ref 0–0.5)
LDLC SERPL CALC-MCNC: 144 MG/DL (ref 0–100)
LYMPHOCYTES # BLD AUTO: 1.29 10*3/MM3 (ref 0.7–3.1)
LYMPHOCYTES NFR BLD AUTO: 27.4 % (ref 19.6–45.3)
MCH RBC QN AUTO: 29.3 PG (ref 26.6–33)
MCHC RBC AUTO-ENTMCNC: 32.9 G/DL (ref 31.5–35.7)
MCV RBC AUTO: 89.1 FL (ref 79–97)
MONOCYTES # BLD AUTO: 0.22 10*3/MM3 (ref 0.1–0.9)
MONOCYTES NFR BLD AUTO: 4.7 % (ref 5–12)
NEUTROPHILS # BLD AUTO: 3.05 10*3/MM3 (ref 1.7–7)
NEUTROPHILS NFR BLD AUTO: 64.7 % (ref 42.7–76)
NRBC BLD AUTO-RTO: 0 /100 WBC (ref 0–0.2)
PLATELET # BLD AUTO: 253 10*3/MM3 (ref 140–450)
POTASSIUM SERPL-SCNC: 4.9 MMOL/L (ref 3.5–5.2)
PROT SERPL-MCNC: 6.7 G/DL (ref 6–8.5)
RBC # BLD AUTO: 4.68 10*6/MM3 (ref 3.77–5.28)
RHEUMATOID FACT SERPL-ACNC: <10 IU/ML
SODIUM SERPL-SCNC: 140 MMOL/L (ref 136–145)
TRIGL SERPL-MCNC: 114 MG/DL (ref 0–150)
VLDLC SERPL CALC-MCNC: 20 MG/DL (ref 5–40)
WBC # BLD AUTO: 4.71 10*3/MM3 (ref 3.4–10.8)

## 2025-07-24 NOTE — PROGRESS NOTES
Your complete blood cell count is fine at this time.  Your liver total cholesterol is at 223 would like to see that below 200.  Your LDL cholesterol is at 144 and we would like to see that below 70.  You can take the next 6 months and work on diet and exercise to see if you can lower these levels however if you are unable to do so then we are going to need to discuss using a statin to help lower these levels.  Your C-reactive protein is just slightly elevated this is something we will monitor for now.  Your hemoglobin A1c is within normal limits.  Your kidney and liver functions are both fine at this time.  Your rheumatoid factor is less than 10.  Which is normal and your sedimentation rate is in normal range.

## 2025-07-24 NOTE — LETTER
Dayana Dyer Ian  [address] [place] [address]    July 24, 2025     Dear Ms. Sosa:    Below are the results from your recent visit:    Your complete blood cell count is fine at this time.  Your liver total cholesterol is at 223 would like to see that below 200.  Your LDL cholesterol is at 144 and we would like to see that below 70.  You can take the next 6 months and work on diet and exercise to see if you can lower these levels however if you are unable to do so then we are going to need to discuss using a statin to help lower these levels.  Your C-reactive protein is just slightly elevated this is something we will monitor for now.  Your hemoglobin A1c is within normal limits.  Your kidney and liver functions are both fine at this time.  Your rheumatoid factor is less than 10.  Which is normal and your sedimentation rate is in normal range.        Resulted Orders   CBC & Differential   Result Value Ref Range    WBC 4.71 3.40 - 10.80 10*3/mm3    RBC 4.68 3.77 - 5.28 10*6/mm3    Hemoglobin 13.7 12.0 - 15.9 g/dL    Hematocrit 41.7 34.0 - 46.6 %    MCV 89.1 79.0 - 97.0 fL    MCH 29.3 26.6 - 33.0 pg    MCHC 32.9 31.5 - 35.7 g/dL    RDW 12.2 (L) 12.3 - 15.4 %    Platelets 253 140 - 450 10*3/mm3    Neutrophil Rel % 64.7 42.7 - 76.0 %    Lymphocyte Rel % 27.4 19.6 - 45.3 %    Monocyte Rel % 4.7 (L) 5.0 - 12.0 %    Eosinophil Rel % 2.8 0.3 - 6.2 %    Basophil Rel % 0.2 0.0 - 1.5 %    Neutrophils Absolute 3.05 1.70 - 7.00 10*3/mm3    Lymphocytes Absolute 1.29 0.70 - 3.10 10*3/mm3    Monocytes Absolute 0.22 0.10 - 0.90 10*3/mm3    Eosinophils Absolute 0.13 0.00 - 0.40 10*3/mm3    Basophils Absolute 0.01 0.00 - 0.20 10*3/mm3    Immature Granulocyte Rel % 0.2 0.0 - 0.5 %    Immature Grans Absolute 0.01 0.00 - 0.05 10*3/mm3    nRBC 0.0 0.0 - 0.2 /100 WBC   Hemoglobin A1c   Result Value Ref Range    Hemoglobin A1C 5.10 4.80 - 5.60 %      Comment:      Hemoglobin A1C Ranges:  Increased Risk for  Diabetes  5.7% to 6.4%  Diabetes                     >= 6.5%  Diabetic Goal                < 7.0%     Comprehensive Metabolic Panel   Result Value Ref Range    Glucose 82 65 - 99 mg/dL    BUN 9.0 6.0 - 20.0 mg/dL    Creatinine 0.88 0.57 - 1.00 mg/dL    EGFR Result 96.0 >60.0 mL/min/1.73      Comment:      GFR Categories in Chronic Kidney Disease (CKD)  GFR Category          GFR (mL/min/1.73)    Interpretation  G1                    90 or greater        Normal or high  (1)  G2                    60-89                Mild decrease  (1)  G3a                   45-59                Mild to moderate  decrease  G3b                   30-44                Moderate to  severe decrease  G4                    15-29                Severe decrease  G5                    14 or less           Kidney failure  (1)In the absence of evidence of kidney disease, neither  GFR category G1 or G2 fulfill the criteria for CKD.  eGFR calculation 2021 CKD-EPI creatinine equation, which  does not include race as a factor      BUN/Creatinine Ratio 10.2 7.0 - 25.0    Sodium 140 136 - 145 mmol/L    Potassium 4.9 3.5 - 5.2 mmol/L    Chloride 104 98 - 107 mmol/L    Total CO2 26.5 22.0 - 29.0 mmol/L    Calcium 9.9 8.6 - 10.5 mg/dL    Total Protein 6.7 6.0 - 8.5 g/dL    Albumin 4.0 3.5 - 5.2 g/dL    Globulin 2.7 gm/dL    A/G Ratio 1.5 g/dL    Total Bilirubin 0.3 0.0 - 1.2 mg/dL    Alkaline Phosphatase 65 39 - 117 U/L    AST (SGOT) 25 1 - 32 U/L    ALT (SGPT) 17 1 - 33 U/L   Lipid Panel   Result Value Ref Range    Total Cholesterol 223 (H) 0 - 200 mg/dL      Comment:      Cholesterol Reference Ranges  (U.S. Department of Health and Human Services ATP III  Classifications)  Desirable          <200 mg/dL  Borderline High    200-239 mg/dL  High Risk          >240 mg/dL  Triglyceride Reference Ranges  (U.S. Department of Health and Human Services ATP III  Classifications)  Normal           <150 mg/dL  Borderline High  150-199 mg/dL  High             200-499  mg/dL  Very High        >500 mg/dL  HDL Reference Ranges  (U.S. Department of Health and Human Services ATP III  Classifications)  Low     <40 mg/dl (major risk factor for CHD)  High    >60 mg/dl ('negative' risk factor for CHD)  LDL Reference Ranges  (U.S. Department of Health and Human Services ATP III  Classifications)  Optimal          <100 mg/dL  Near Optimal     100-129 mg/dL  Borderline High  130-159 mg/dL  High             160-189 mg/dL  Very High        >189 mg/dL  LDL is calculated using the NIH LDL-C calculation.      Triglycerides 114 0 - 150 mg/dL    HDL Cholesterol 59 40 - 60 mg/dL    VLDL Cholesterol Abdi 20 5 - 40 mg/dL    LDL Chol Calc (NIH) 144 (H) 0 - 100 mg/dL   Rheumatoid Factor   Result Value Ref Range    RA Latex Turbid <10.0 <14.0 IU/mL   Sedimentation Rate   Result Value Ref Range    Sed Rate 16 0 - 20 mm/hr   C-reactive Protein   Result Value Ref Range    C-Reactive Protein 0.89 (H) 0.00 - 0.50 mg/dL     If you have any questions or concerns, please don't hesitate to call.    Sincerely,        Earnestine Kunz APRN

## 2025-07-30 ENCOUNTER — OFFICE VISIT (OUTPATIENT)
Dept: FAMILY MEDICINE CLINIC | Facility: CLINIC | Age: 20
End: 2025-07-30
Payer: COMMERCIAL

## 2025-07-30 VITALS
WEIGHT: 108 LBS | OXYGEN SATURATION: 98 % | BODY MASS INDEX: 19.88 KG/M2 | HEIGHT: 62 IN | HEART RATE: 97 BPM | SYSTOLIC BLOOD PRESSURE: 104 MMHG | DIASTOLIC BLOOD PRESSURE: 62 MMHG

## 2025-07-30 DIAGNOSIS — M25.50 ARTHRALGIA, UNSPECIFIED JOINT: ICD-10-CM

## 2025-07-30 DIAGNOSIS — M25.60 JOINT STIFFNESS: Primary | ICD-10-CM

## 2025-07-30 DIAGNOSIS — R76.8 ELEVATED RHEUMATOID FACTOR: ICD-10-CM

## 2025-07-30 NOTE — PROGRESS NOTES
Subjective   Dayana Sosa is a 20 y.o. female. Presents today for   Chief Complaint   Patient presents with    Joint Pain     Persistent  Fingers,Wrist,Toes, Knees and shoulder.  Has tried Ice Heat Alleve and or Ibuprofen.   Paternal Grandfather had Rheumatoid Arthritis       History Of Present Illness    History of Present Illness  The patient presents for evaluation of joint pain.    She reports experiencing pain in all her joints, which has progressively worsened since her last visit. Initially, she only felt discomfort in her wrist, but now the pain has spread to her fingers, toes, and shoulders. She also notes that her fingers and toes have a reddish hue. 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 She expresses concern about the possibility of rheumatoid arthritis, given her family history and the sudden onset of her symptoms. She is scheduled to start a new job soon and is worried about how her condition might affect her ability to work. She has tried various over-the-counter medications such as Advil, ibuprofen, Aleve, and Tylenol, along with heat and ice therapy, but these have not provided significant relief.    FAMILY HISTORY  Her grandfather has rheumatoid arthritis.    MEDICATIONS  Current: Advil, ibuprofen, Aleve, Tylenol    There is no problem list on file for this patient.      Social History     Socioeconomic History    Marital status: Single    Number of children: 0   Tobacco Use    Smoking status: Never     Passive exposure: Never    Smokeless tobacco: Never    Tobacco comments:     Caffeine: 1 tea per day   Vaping Use    Vaping status: Never Used   Substance and Sexual Activity    Alcohol use: Not Currently    Drug use: Not Currently    Sexual activity: Yes       Allergies   Allergen Reactions    Zofran [Ondansetron] Hives       Current Outpatient Medications on File Prior to Visit   Medication Sig Dispense Refill    buPROPion XL (Wellbutrin XL) 300 MG 24 hr tablet  "Take 1 tablet by mouth Daily. 90 tablet 1    desogestrel-ethinyl estradiol (APRI) 0.15-30 MG-MCG per tablet Take 1 tablet by mouth Daily. 84 tablet 3    FLUoxetine (PROzac) 40 MG capsule Take 1 capsule by mouth Daily. 90 capsule 1    lisdexamfetamine (VYVANSE) 50 MG capsule Take 1 capsule by mouth Daily 30 capsule 0    minocycline (MINOCIN,DYNACIN) 50 MG capsule Take 1 capsule by mouth Daily. 30 capsule 3    spironolactone (ALDACTONE) 100 MG tablet Take 1 tablet by mouth Every Night. 30 tablet 3    desogestrel-ethinyl estradiol (Isibloom) 0.15-30 MG-MCG per tablet Take 1 tablet by mouth Daily. (Patient not taking: Reported on 7/30/2025) 28 tablet 4    hydrocortisone 2.5 % cream Apply 1 application  topically to the appropriate area as directed Daily As Needed. Do not use more than 15 days (Patient not taking: Reported on 7/30/2025) 30 g 2     No current facility-administered medications on file prior to visit.       Objective   Vitals:    07/30/25 1454   BP: 104/62   Pulse: 97   SpO2: 98%   Weight: 49 kg (108 lb)   Height: 157.5 cm (62.01\")     Body mass index is 19.75 kg/m².    Physical Exam    Physical Exam  Constitutional:       Appearance: Normal appearance.   HENT:      Head: Normocephalic and atraumatic.      Mouth/Throat:      Mouth: Mucous membranes are moist.   Cardiovascular:      Rate and Rhythm: Normal rate and regular rhythm.      Pulses: Normal pulses.      Heart sounds: Normal heart sounds.   Pulmonary:      Effort: Pulmonary effort is normal.      Breath sounds: Normal breath sounds.   Musculoskeletal:      Comments: Bilateral knuckles appear errythemic and she is complaining of pain in all of her joints.   Neurological:      Mental Status: She is alert.         Results  Laboratory Studies  Rheumatoid factor was normal. C-reactive protein was slightly elevated. Sedimentation rate was normal.       Procedures     Assessment & Plan   Diagnoses and all orders for this visit:    1. Joint stiffness " (Primary)  -     14.3.3 ETA, Rheum. Arthritis  -     NATALIA With / DsDNA, RNP, Sjogrens A / B, Smith  -     Cyclic Citrul Peptide Antibody, IgG / IgA  -     Ambulatory Referral to Rheumatology    2. Elevated rheumatoid factor    3. Arthralgia, unspecified joint  -     Ambulatory Referral to Rheumatology         Assessment & Plan  1. Joint pain.  She reports pain in her wrists, fingers, toes, and shoulders, with redness and swelling noted in some areas. Previous lab results showed a normal rheumatoid factor, slightly elevated C-reactive protein, and a normal sedimentation rate. A referral to rheumatology has been made for further evaluation. She is advised to take Tylenol Arthritis for pain relief. Additionally, she can try using paraffin wax therapy for her hands to alleviate joint pain and improve hand softness. Blood work will be conducted today to further investigate the cause of her symptoms.           BMI is within normal parameters. No other follow-up for BMI required.     No follow-ups on file.     Discussed Care Gaps, ordered referrals and encouraged vaccination updates.       - Pt agrees with plan of care and denies further questions/concerns today  - This document is intended for medical expert use only. Persons  reading this document without medical staff guidance may result in misinterpretation and unintended morbidity     Go to the ER for any possible life-threatening symptoms such as chest pain or shortness of air.      Please allow 3-5 business days for recommendations based on new results      I personally spent time with this patient, preparing for the visit, reviewing tests, obtaining and/or reviewing a separately obtained history, performing a medically appropriate examination and/or evaluation, counseling and educating the patient/family/caregiver, ordering medications,  documenting information in the medical record and indepentently interpreting results.         Patient or patient representative  verbalized consent for the use of Ambient Listening during the visit with  JILLIAN Snyder for chart documentation. 7/30/2025  16:24 EDT

## 2025-07-31 ENCOUNTER — TELEPHONE (OUTPATIENT)
Dept: FAMILY MEDICINE CLINIC | Facility: CLINIC | Age: 20
End: 2025-07-31
Payer: COMMERCIAL

## 2025-07-31 ENCOUNTER — PATIENT MESSAGE (OUTPATIENT)
Dept: FAMILY MEDICINE CLINIC | Facility: CLINIC | Age: 20
End: 2025-07-31
Payer: COMMERCIAL

## 2025-07-31 NOTE — TELEPHONE ENCOUNTER
UPON RECENT REVIEW OF THE REFERRAL, WHAT SPECIFIC QUESTION(S) CAN  ANSWER FOR THIS PATIENT? PLEASE ADVISE. THANKS

## 2025-08-01 RX ORDER — METHYLPREDNISOLONE 4 MG/1
TABLET ORAL
Qty: 21 TABLET | Refills: 0 | Status: SHIPPED | OUTPATIENT
Start: 2025-08-01

## 2025-08-06 LAB
14-3-3 ETA AG SER IA-MCNC: <0.2 NG/ML
ANA SER QL: NEGATIVE
CCP IGA+IGG SERPL IA-ACNC: 4 UNITS (ref 0–19)

## 2025-08-13 ENCOUNTER — PATIENT MESSAGE (OUTPATIENT)
Dept: FAMILY MEDICINE CLINIC | Facility: CLINIC | Age: 20
End: 2025-08-13

## 2025-08-13 DIAGNOSIS — M25.50 ARTHRALGIA, UNSPECIFIED JOINT: Primary | ICD-10-CM

## 2025-08-27 RX ORDER — METHYLPREDNISOLONE 4 MG/1
TABLET ORAL
Qty: 21 TABLET | Refills: 0 | OUTPATIENT
Start: 2025-08-27